# Patient Record
Sex: MALE | Race: WHITE | NOT HISPANIC OR LATINO | URBAN - METROPOLITAN AREA
[De-identification: names, ages, dates, MRNs, and addresses within clinical notes are randomized per-mention and may not be internally consistent; named-entity substitution may affect disease eponyms.]

---

## 2018-04-10 ENCOUNTER — APPOINTMENT (RX ONLY)
Dept: URBAN - METROPOLITAN AREA CLINIC 70 | Facility: CLINIC | Age: 28
Setting detail: DERMATOLOGY
End: 2018-04-10

## 2018-04-10 ENCOUNTER — APPOINTMENT (RX ONLY)
Dept: URBAN - METROPOLITAN AREA CLINIC 71 | Facility: CLINIC | Age: 28
Setting detail: DERMATOLOGY
End: 2018-04-10

## 2018-04-10 DIAGNOSIS — D22 MELANOCYTIC NEVI: ICD-10-CM

## 2018-04-10 DIAGNOSIS — L91.8 OTHER HYPERTROPHIC DISORDERS OF THE SKIN: ICD-10-CM

## 2018-04-10 DIAGNOSIS — L259 CONTACT DERMATITIS AND OTHER ECZEMA, UNSPECIFIED CAUSE: ICD-10-CM

## 2018-04-10 PROBLEM — D22.5 MELANOCYTIC NEVI OF TRUNK: Status: ACTIVE | Noted: 2018-04-10

## 2018-04-10 PROBLEM — D22.61 MELANOCYTIC NEVI OF RIGHT UPPER LIMB, INCLUDING SHOULDER: Status: ACTIVE | Noted: 2018-04-10

## 2018-04-10 PROBLEM — D22.72 MELANOCYTIC NEVI OF LEFT LOWER LIMB, INCLUDING HIP: Status: ACTIVE | Noted: 2018-04-10

## 2018-04-10 PROBLEM — D22.71 MELANOCYTIC NEVI OF RIGHT LOWER LIMB, INCLUDING HIP: Status: ACTIVE | Noted: 2018-04-10

## 2018-04-10 PROBLEM — D22.62 MELANOCYTIC NEVI OF LEFT UPPER LIMB, INCLUDING SHOULDER: Status: ACTIVE | Noted: 2018-04-10

## 2018-04-10 PROBLEM — L30.8 OTHER SPECIFIED DERMATITIS: Status: ACTIVE | Noted: 2018-04-10

## 2018-04-10 PROCEDURE — ? SKIN TAG REMOVAL MULTI (COSMETIC)

## 2018-04-10 PROCEDURE — 99214 OFFICE O/P EST MOD 30 MIN: CPT

## 2018-04-10 PROCEDURE — ? COUNSELING

## 2018-04-10 PROCEDURE — ? TREATMENT REGIMEN

## 2018-04-10 ASSESSMENT — LOCATION ZONE DERM
LOCATION ZONE: AXILLAE
LOCATION ZONE: HAND
LOCATION ZONE: LEG
LOCATION ZONE: HAND
LOCATION ZONE: TRUNK
LOCATION ZONE: AXILLAE
LOCATION ZONE: LEG
LOCATION ZONE: ARM
LOCATION ZONE: ARM
LOCATION ZONE: TRUNK

## 2018-04-10 ASSESSMENT — LOCATION DETAILED DESCRIPTION DERM
LOCATION DETAILED: LEFT PROXIMAL DORSAL FOREARM
LOCATION DETAILED: LEFT MID-UPPER BACK
LOCATION DETAILED: RIGHT SUPERIOR LATERAL UPPER BACK
LOCATION DETAILED: LEFT SUPERIOR LATERAL UPPER BACK
LOCATION DETAILED: EPIGASTRIC SKIN
LOCATION DETAILED: LEFT INFERIOR LATERAL UPPER BACK
LOCATION DETAILED: LEFT INFERIOR LATERAL UPPER BACK
LOCATION DETAILED: RIGHT AXILLARY VAULT
LOCATION DETAILED: RIGHT POSTERIOR AXILLA
LOCATION DETAILED: RIGHT RADIAL PALM
LOCATION DETAILED: RIGHT AXILLARY VAULT
LOCATION DETAILED: PERIUMBILICAL SKIN
LOCATION DETAILED: LEFT RADIAL PALM
LOCATION DETAILED: LEFT PROXIMAL DORSAL FOREARM
LOCATION DETAILED: INFERIOR THORACIC SPINE
LOCATION DETAILED: LEFT SUPERIOR LATERAL UPPER BACK
LOCATION DETAILED: LEFT RADIAL PALM
LOCATION DETAILED: RIGHT DISTAL POSTERIOR THIGH
LOCATION DETAILED: RIGHT SUPERIOR MEDIAL MIDBACK
LOCATION DETAILED: LEFT MID-UPPER BACK
LOCATION DETAILED: LEFT DISTAL POSTERIOR THIGH
LOCATION DETAILED: EPIGASTRIC SKIN
LOCATION DETAILED: RIGHT PROXIMAL POSTERIOR UPPER ARM
LOCATION DETAILED: RIGHT PROXIMAL POSTERIOR UPPER ARM
LOCATION DETAILED: PERIUMBILICAL SKIN
LOCATION DETAILED: RIGHT POSTERIOR AXILLA
LOCATION DETAILED: LEFT ANTERIOR PROXIMAL THIGH
LOCATION DETAILED: LEFT DISTAL POSTERIOR THIGH
LOCATION DETAILED: RIGHT ANTERIOR PROXIMAL THIGH
LOCATION DETAILED: RIGHT SUPERIOR MEDIAL MIDBACK
LOCATION DETAILED: RIGHT SUPERIOR LATERAL UPPER BACK
LOCATION DETAILED: RIGHT RADIAL PALM
LOCATION DETAILED: LEFT ANTERIOR PROXIMAL THIGH
LOCATION DETAILED: INFERIOR THORACIC SPINE
LOCATION DETAILED: RIGHT MEDIAL INFERIOR CHEST
LOCATION DETAILED: RIGHT MEDIAL INFERIOR CHEST
LOCATION DETAILED: RIGHT ANTERIOR PROXIMAL THIGH
LOCATION DETAILED: RIGHT DISTAL POSTERIOR THIGH

## 2018-04-10 ASSESSMENT — LOCATION SIMPLE DESCRIPTION DERM
LOCATION SIMPLE: LEFT UPPER BACK
LOCATION SIMPLE: UPPER BACK
LOCATION SIMPLE: RIGHT THIGH
LOCATION SIMPLE: RIGHT LOWER BACK
LOCATION SIMPLE: RIGHT POSTERIOR AXILLA
LOCATION SIMPLE: LEFT THIGH
LOCATION SIMPLE: RIGHT HAND
LOCATION SIMPLE: RIGHT AXILLARY VAULT
LOCATION SIMPLE: LEFT FOREARM
LOCATION SIMPLE: LEFT POSTERIOR THIGH
LOCATION SIMPLE: RIGHT UPPER BACK
LOCATION SIMPLE: RIGHT THIGH
LOCATION SIMPLE: UPPER BACK
LOCATION SIMPLE: CHEST
LOCATION SIMPLE: RIGHT HAND
LOCATION SIMPLE: LEFT UPPER BACK
LOCATION SIMPLE: RIGHT POSTERIOR THIGH
LOCATION SIMPLE: LEFT THIGH
LOCATION SIMPLE: RIGHT UPPER BACK
LOCATION SIMPLE: RIGHT UPPER ARM
LOCATION SIMPLE: LEFT POSTERIOR THIGH
LOCATION SIMPLE: RIGHT AXILLARY VAULT
LOCATION SIMPLE: RIGHT POSTERIOR THIGH
LOCATION SIMPLE: LEFT FOREARM
LOCATION SIMPLE: RIGHT POSTERIOR AXILLA
LOCATION SIMPLE: LEFT HAND
LOCATION SIMPLE: RIGHT LOWER BACK
LOCATION SIMPLE: ABDOMEN
LOCATION SIMPLE: CHEST
LOCATION SIMPLE: LEFT HAND
LOCATION SIMPLE: RIGHT UPPER ARM
LOCATION SIMPLE: ABDOMEN

## 2018-04-10 NOTE — PROCEDURE: SKIN TAG REMOVAL MULTI (COSMETIC)
Price (Use Numbers Only, No Special Characters Or $): 150
Detail Level: Detailed
Removed With: gradle excision
Total Number Of Lesions Treated: 5
Anesthesia Type: 1% lidocaine with epinephrine
Anesthesia Volume In Cc: 1.5
Consent: Written consent obtained and the risks of skin tag removal was reviewed with the patient including but not limited to bleeding, pigmentary change, infection, pain, and remote possibility of scarring.

## 2018-04-10 NOTE — PROCEDURE: SKIN TAG REMOVAL MULTI (COSMETIC)
Consent: Written consent obtained and the risks of skin tag removal was reviewed with the patient including but not limited to bleeding, pigmentary change, infection, pain, and remote possibility of scarring.
Anesthesia Type: 1% lidocaine with epinephrine
Removed With: gradle excision
Price (Use Numbers Only, No Special Characters Or $): 150
Detail Level: Detailed
Anesthesia Volume In Cc: 1.5
Total Number Of Lesions Treated: 5

## 2018-04-20 ENCOUNTER — APPOINTMENT (RX ONLY)
Dept: URBAN - METROPOLITAN AREA CLINIC 71 | Facility: CLINIC | Age: 28
Setting detail: DERMATOLOGY
End: 2018-04-20

## 2018-04-20 ENCOUNTER — APPOINTMENT (RX ONLY)
Dept: URBAN - METROPOLITAN AREA CLINIC 70 | Facility: CLINIC | Age: 28
Setting detail: DERMATOLOGY
End: 2018-04-20

## 2018-04-20 DIAGNOSIS — L91.8 OTHER HYPERTROPHIC DISORDERS OF THE SKIN: ICD-10-CM

## 2018-04-20 PROCEDURE — ? COUNSELING

## 2018-04-20 PROCEDURE — ? SKIN TAG REMOVAL (COSMETIC)

## 2018-04-20 PROCEDURE — 99213 OFFICE O/P EST LOW 20 MIN: CPT

## 2018-04-20 PROCEDURE — ? TREATMENT REGIMEN

## 2018-04-20 ASSESSMENT — LOCATION SIMPLE DESCRIPTION DERM
LOCATION SIMPLE: LEFT FOREHEAD
LOCATION SIMPLE: LEFT FOREHEAD

## 2018-04-20 ASSESSMENT — LOCATION DETAILED DESCRIPTION DERM
LOCATION DETAILED: LEFT FOREHEAD
LOCATION DETAILED: LEFT FOREHEAD

## 2018-04-20 ASSESSMENT — LOCATION ZONE DERM
LOCATION ZONE: FACE
LOCATION ZONE: FACE

## 2018-04-20 NOTE — PROCEDURE: SKIN TAG REMOVAL (COSMETIC)
Anesthesia Volume In Cc: 2
Anesthesia Type: 1% lidocaine with epinephrine
Price (Use Numbers Only, No Special Characters Or $): 0
Removed With: gradle excision
Detail Level: Detailed
Consent: Written consent obtained and the risks of skin tag removal was reviewed with the patient including but not limited to bleeding, pigmentary change, infection, pain, and remote possibility of scarring.

## 2018-04-20 NOTE — PROCEDURE: SKIN TAG REMOVAL (COSMETIC)
Price (Use Numbers Only, No Special Characters Or $): 0
Detail Level: Detailed
Consent: Written consent obtained and the risks of skin tag removal was reviewed with the patient including but not limited to bleeding, pigmentary change, infection, pain, and remote possibility of scarring.
Anesthesia Volume In Cc: 2
Removed With: gradle excision
Anesthesia Type: 1% lidocaine with epinephrine

## 2020-11-03 NOTE — HPI: SKIN LESION (SKIN TAGS)
How Severe Are They?: mild
You were seen and evaluated emergency department for low platelets, and received 2 units of platelets.  Please follow-up with Dr. Reddy in the next 1 to 2 days.  Please return the emergency department if you develop bleeding, dark stools, shortness of breath, chest pain or other concerning symptoms.  
Is This A New Presentation, Or A Follow-Up?: Skin Lesions

## 2021-07-28 NOTE — ED QUICK NOTES
Assumed care of Sohail from [facility]. He is currently sleeping. Remains in seclusion. Will continue to monitor.

## 2021-07-28 NOTE — ED QUICK NOTES
Gave RN-RN report to Padilla at [facility].  He will contact [facility] ED with accepting physician.

## 2021-07-28 NOTE — ED QUICK NOTES
Care endorsed to [facility]. Jenny Vega, psych liason, talking with patient's family. Patient calm on cart, appears asleep.

## 2021-07-28 NOTE — ED QUICK NOTES
Received report from Monica. Patient resting on cart sleeping. Patient in no distress. Respirations are spontaneous/regular rate and rhythm. 1:1 observation continues.

## 2021-07-28 NOTE — ED NOTES
Callback from Lisa Bowles with [facility], [facility]). Libby Zenaida has been accepted for admission under the care of Dr Verna Torres. [facility] requests that transport be scheduled for 1 p.m.

## 2021-07-28 NOTE — ED PROVIDER NOTES
Patient Seen in: [facility] AND [facility] Emergency Department      History   Patient presents with:  Eval-P    Stated Complaint:     HPI/Subjective:   HPI  History is provided by patient and EMS.     24-year-old male with history of psychosis brought in by EMS 114/69   Pulse 50   Temp 98.9 °F (37.2 °C) (Temporal)   Resp 16   Ht 180.3 cm (5' 11\")   Wt 90.7 kg   SpO2 98%   BMI 27.89 kg/m²         Physical Exam  Vitals and nursing note reviewed. HENT:      Head: Normocephalic.       Mouth/Throat:      Mouth: Muco Protein Urine Negative Negative mg/dL    Urobilinogen Urine <2.0 <2.0    Nitrite Urine Negative Negative    Leukocyte Esterase Urine Negative Negative    WBC Urine 1-5 0 - 5 /HPF    RBC Urine 3-5 (A) 0 - 2 /HPF    Bacteria Urine Rare (A) None Seen /HPF Eosinophil % 0.5 %    Basophil % 0.4 %    Immature Granulocyte % 0.3 %   RAINBOW DRAW GOLD    Collection Time: 07/27/21 10:10 PM   Result Value Ref Range    Hold Gold Auto Resulted    Ethyl Alcohol    Collection Time: 07/27/21 10:10 PM   Result Value Ref R exam and reviewing the diagnostics, multiple initial diagnoses were considered based on the presenting problem including SI, HI, hallucinations.                Disposition and Plan     Clinical Impression:  Psychosis, unspecified psychosis type ([facility] [address]  (prim

## 2021-07-28 NOTE — ED NOTES
Contacted by [facility] triage. Advised that father came to ER inquiring about disposition. Called back father explained that I had no SHARRON/ consent to speak to him and can not release information.  .  (Also advised by Opal Mistry LPC that [address] had st

## 2021-07-28 NOTE — BH LEVEL OF CARE ASSESSMENT
Crisis Evaluation Assessment    Dino Mcgill YOB: 1990   Age 26year old MRN [id number]   Location [address] [facility] Attending Katy Thakkar MD      Patient's legal sex: male  Patient identifies as: male  Patient's jake reports being hospitalized in [facility] for ingesting 8 pills and saying that he did not want to live anymore.   Patient was transferred over to [facility] where he spent 2 weeks in the hospital.  Family is concerned because the patient most recently has stopped e time was prescribed to him for ADHD.   Historically the family also reports that the patient has a history of making false claims previously when the patient had expressed that he wanted to go to the hospital the father had brought him there and as soon as to family patient does not have overt aggressive/homicidal behavior. However he does have a history of gesturing to want to hurt his brother (clenched fists).           Suicide Risk Assessments:    Source of information for CSSR: Patient;Collateral  In [place] stress. Non-Suicidal Self-Injury:   Patient denies current or historical self-harm behavior.             Access to Means:  Access to Means  Has access to means to attempt suicide or harm others or property: Yes  Description of Access: household smoking cigarettes more excessively. And they also report that the patient has not been getting any sleep. Substance Use:  Patient denies using drugs or alcohol. Patient also denies a history of any known withdrawal symptoms.     BAL <3, UDS is p story is not accurate indicating that the patient's mother is currently living in [place]. A couple of days ago the patient got upset because he had attempted to try to contact his mother on pole and because he wanted to go out there to live.   She told him Organized  Content: Ordinary  Level of Consciousness: Alert  Level of Consciousness: Alert  Behavior  Exhibited behavior: Appropriate to situation      Disposition:    Assessment Summary:   Patient is a 78-year-old male who presents to the ED via EMS due t Verbalized Understanding: Yes        Diagnoses:  Primary Psychiatric Diagnosis  F 27 psychosis unspecified     Secondary Psychiatric Diagnoses    Pervasive Diagnoses    Pertinent Non-Psychiatric Diagnoses          Maribeth SNYDER, LPC    This note was prepared us

## 2021-07-28 NOTE — PROGRESS NOTES
Information below was entered by Suma Prescott  LPC:       07/28/21 0131   COVID Exposure Risk Screening   Have you been practicing social distancing? Yes   Have you been wearing a mask when in the community?  Yes   Are the people you live with followin

## 2021-07-28 NOTE — ED INITIAL ASSESSMENT (HPI)
Fam called due to concern for pt who has been acting strangely and talking too much of his psych medication. Also reports pt was hearing voices.

## 2021-07-28 NOTE — ED QUICK NOTES
Assumed care of patient from [address] [place] [address]. Patient is asleep at this time, breathing with ease.

## 2023-02-26 PROBLEM — R45.89 SUICIDAL BEHAVIOR: Status: ACTIVE | Noted: 2020-06-29

## 2023-02-27 PROBLEM — F25.9 SCHIZOAFFECTIVE DISORDER, CHRONIC CONDITION WITH ACUTE EXACERBATION (HCC): Status: ACTIVE | Noted: 2023-02-27

## 2023-02-28 ENCOUNTER — APPOINTMENT (OUTPATIENT)
Dept: CT IMAGING | Facility: HOSPITAL | Age: 33
End: 2023-02-28
Attending: EMERGENCY MEDICINE

## 2023-02-28 PROCEDURE — 74176 CT ABD & PELVIS W/O CONTRAST: CPT | Performed by: EMERGENCY MEDICINE

## 2023-03-01 ENCOUNTER — HOSPITAL ENCOUNTER (EMERGENCY)
Facility: HOSPITAL | Age: 33
Discharge: HOME | End: 2023-03-02
Attending: EMERGENCY MEDICINE

## 2023-03-01 DIAGNOSIS — K59.00 CONSTIPATION, UNSPECIFIED CONSTIPATION TYPE: Primary | ICD-10-CM

## 2023-03-01 PROCEDURE — 99283 EMERGENCY DEPT VISIT LOW MDM: CPT

## 2023-03-01 PROCEDURE — 99284 EMERGENCY DEPT VISIT MOD MDM: CPT

## 2023-03-02 ENCOUNTER — APPOINTMENT (OUTPATIENT)
Dept: GENERAL RADIOLOGY | Facility: HOSPITAL | Age: 33
End: 2023-03-02
Attending: EMERGENCY MEDICINE

## 2023-03-02 VITALS
WEIGHT: 170 LBS | RESPIRATION RATE: 16 BRPM | HEART RATE: 58 BPM | DIASTOLIC BLOOD PRESSURE: 98 MMHG | TEMPERATURE: 98 F | HEIGHT: 77 IN | SYSTOLIC BLOOD PRESSURE: 121 MMHG | BODY MASS INDEX: 20.07 KG/M2 | OXYGEN SATURATION: 98 %

## 2023-03-02 PROCEDURE — 74018 RADEX ABDOMEN 1 VIEW: CPT | Performed by: EMERGENCY MEDICINE

## 2023-03-02 NOTE — ED INITIAL ASSESSMENT (HPI)
Aox4. Complaints of constipation. States last bowel movement 'few weeks ago'. Pt states seen here for same recently. Per [facility], here for acute psychosis feb 26th. No obvious psychosis at this time.

## 2023-03-02 NOTE — DISCHARGE INSTRUCTIONS
Please drink the entire bottle of magnesium citrate for your constipation. You can also supplement with 5 or 6 doses of the MiraLAX that you were given yesterday. Between the 2 laxatives, and you will have bowel movements to clear out your bowels.